# Patient Record
Sex: MALE | Race: WHITE | NOT HISPANIC OR LATINO | Employment: FULL TIME | URBAN - METROPOLITAN AREA
[De-identification: names, ages, dates, MRNs, and addresses within clinical notes are randomized per-mention and may not be internally consistent; named-entity substitution may affect disease eponyms.]

---

## 2023-07-09 ENCOUNTER — OFFICE VISIT (OUTPATIENT)
Dept: URGENT CARE | Facility: URGENT CARE | Age: 53
End: 2023-07-09
Payer: OTHER MISCELLANEOUS

## 2023-07-09 VITALS
HEART RATE: 77 BPM | RESPIRATION RATE: 22 BRPM | TEMPERATURE: 98.3 F | DIASTOLIC BLOOD PRESSURE: 82 MMHG | OXYGEN SATURATION: 98 % | SYSTOLIC BLOOD PRESSURE: 135 MMHG

## 2023-07-09 DIAGNOSIS — S65.511A LACERATION OF BLOOD VESSEL OF LEFT INDEX FINGER, INITIAL ENCOUNTER: Primary | ICD-10-CM

## 2023-07-09 PROCEDURE — 12001 RPR S/N/AX/GEN/TRNK 2.5CM/<: CPT | Performed by: EMERGENCY MEDICINE

## 2023-07-09 RX ORDER — LEVOTHYROXINE SODIUM 150 UG/1
TABLET ORAL
COMMUNITY
Start: 2023-05-12

## 2023-07-09 RX ORDER — CLOBETASOL PROPIONATE 0.5 MG/G
CREAM TOPICAL
COMMUNITY
Start: 2022-10-18

## 2023-07-09 RX ORDER — ASPIRIN 81 MG/1
81 TABLET ORAL DAILY
COMMUNITY

## 2023-07-09 RX ORDER — ROSUVASTATIN CALCIUM 40 MG/1
40 TABLET, COATED ORAL
COMMUNITY
Start: 2023-05-19

## 2023-07-09 NOTE — PROGRESS NOTES
Assessment & Plan     Diagnosis:    ICD-10-CM    1. Laceration of blood vessel of left index finger, initial encounter  S65.511A CONTROL HEMORRHAGE W/ CAUTERY          Medical Decision Making:  Nael Soriano is a 53 year old male who presents for evaluation of a bleeding wound consistent with a likely small blood vessel injury to the left index finger. This has intermittently been opening and bleeding. Given duration of symptoms and continued bleeding I suspect this is a small superficial blood vessel that was pinched open when it initially happened 4 days ago. After numbing with topical lidocaine/epi I cauterized with silver nitrate with good response. No further bleeding. No signs of infection or bony injury. No blood thinners or hx of bleeding disorders.  No signs of foreign body.  Possible complications (infection, scarring) were reviewed with the patient.  Follow up with primary care will be indicated for wound recheck; return if recurrent bleeding. Tetanus up to date.      Jason Juan PA-C  [facility]    Subjective     HPI   Nael Soriano is a 53 year old male who presents to clinic today for the following health issues:  Chief Complaint   Patient presents with     Puncture Wound     Left index finger 7/5/2023 when caught in between some boxes @ work     Pinched just the skin 4 days ago. No bony injury or difficulties moving the finger. No signs of infection or redness or pus. Just has persistently been opening up after hitting it or showering.       Review of Systems    See HPI    Objective      Vitals: /82   Pulse 77   Temp 98.3  F (36.8  C)   Resp 22   SpO2 98%   Patient Vitals for the past 24 hrs:   BP Temp Pulse Resp SpO2   07/09/23 1128 135/82 98.3  F (36.8  C) 77 22 98 %       Vital signs reviewed by: Jason Juan PA-C    Physical Exam   Constitutional: Patient is alert and cooperative. No acute distress.  Cardiovascular: cap refill <2 seconds left index  finger  Neurological: Alert and oriented x3. Strength and sensation intact in the left hand.   MSK/Skin: small puncture-like bleeding wound, 0.25cm in size to the left index finger. Scant active bleeding. No bony tenderness. Normal ROM at the PIP/DIP joints. No erythema, warmth or marked swelling.   Psychiatric:The patient has a normal mood and affect.         Laceration Repair        LACERATION:  A clean 0.25cm laceration.      LOCATION:  Left index finger      FUNCTION:  Distally sensation, circulation, motor and tendon function are intact.      ANESTHESIA:  Topically applied 1% lidocaine with epi      PREPARATION:  Scrubbing with sterile water      DEBRIDEMENT:  no debridement and wound explored, no foreign body found      CLOSURE:  Wound was closed with silver nitrate stick.         Jason Juan PA-C, July 9, 2023